# Patient Record
Sex: FEMALE | Race: WHITE | NOT HISPANIC OR LATINO | Employment: FULL TIME
[De-identification: names, ages, dates, MRNs, and addresses within clinical notes are randomized per-mention and may not be internally consistent; named-entity substitution may affect disease eponyms.]

---

## 2017-06-10 ENCOUNTER — HEALTH MAINTENANCE LETTER (OUTPATIENT)
Age: 47
End: 2017-06-10

## 2019-09-30 ENCOUNTER — HEALTH MAINTENANCE LETTER (OUTPATIENT)
Age: 49
End: 2019-09-30

## 2021-01-15 ENCOUNTER — HEALTH MAINTENANCE LETTER (OUTPATIENT)
Age: 51
End: 2021-01-15

## 2021-03-14 ENCOUNTER — HEALTH MAINTENANCE LETTER (OUTPATIENT)
Age: 51
End: 2021-03-14

## 2021-10-24 ENCOUNTER — HEALTH MAINTENANCE LETTER (OUTPATIENT)
Age: 51
End: 2021-10-24

## 2022-02-13 ENCOUNTER — HEALTH MAINTENANCE LETTER (OUTPATIENT)
Age: 52
End: 2022-02-13

## 2022-04-10 ENCOUNTER — HEALTH MAINTENANCE LETTER (OUTPATIENT)
Age: 52
End: 2022-04-10

## 2022-10-15 ENCOUNTER — HEALTH MAINTENANCE LETTER (OUTPATIENT)
Age: 52
End: 2022-10-15

## 2022-10-26 ENCOUNTER — TELEPHONE (OUTPATIENT)
Dept: CARDIOLOGY | Facility: CLINIC | Age: 52
End: 2022-10-26

## 2022-10-26 NOTE — TELEPHONE ENCOUNTER
Peggy called to discuss the history of HCM in her family and the recommendation she was given to have genetic testing to predict her risk of developing disease.      Explained that genetic testing performed in her brother revealed a variant of unknown significance. (VUS).  A VUS is a genetic change in which we do NOT have enough data to determine if it alone is disease causing or not.  To add information regarding this specific variant, Peggy's family members participated in a family study.     Brother Prateek has a diagnosis of HCM and had genetic testing which revealed a variant of unknown significance in the ALPK3 gene.  This gene is typically inherited in an AR pattern, meaning that an individual has to have two mutations to cause disease.  However, more recently there have been reports of dominant inheritance of HCM with adult onset of left ventricular hypertrophy.  Therefore, Bizdom recommended family testing to see if anything more could be learned from this variant.      Peggy's father, mother, and sister Kymberly were approved for participation in the family studies.  Testing showed that Peggy's father Virgilio, who has a history of AFib, ablations, and LVH on ECHO, carries the ALPK3 variant.  Peggy's sister Kymberly, who had a cardiac arrest and HCM, also carries this variant. Prateek's mother Sayar does not have symptoms and does NOT carry the ALPK3 vus.    These finding add to the evidence of one ALPK3 variant being sufficient to cause disease.  However, information from one family is NOT enough to change classification of this variant.      At this time, clinical screening is recommended for all at risk family members.  Genetic testing is NOT recommended for predictive testing because results would NOT changes screening recommendations.  There is still not enough evidence to be certain this change alone is the cause of disease in this family.    Clinical screening should include ECHO, EKG, heart monitor, and  physical exam with cardiologist.  Additional testing may include MRI and stress testing.     All questions answered at this time.     Mily Roblero MS, [facility]  Licensed, Certified Genetic Counselor  [facility]  [facility]

## 2022-10-28 ENCOUNTER — CARE COORDINATION (OUTPATIENT)
Dept: CARDIOLOGY | Facility: CLINIC | Age: 52
End: 2022-10-28

## 2022-10-28 DIAGNOSIS — Z13.6 SCREENING FOR HEART DISEASE: Primary | ICD-10-CM

## 2022-10-28 DIAGNOSIS — Z84.81 FAMILY HISTORY OF GENETIC DISEASE CARRIER: ICD-10-CM

## 2022-10-28 NOTE — PROGRESS NOTES
Date: 10/28/2022    Time of Call: 11:21 AM     Diagnosis:  Genetic testing VUS for brother, family screening for HCM     [ TORB ] Ordering provider: Dr Doroteo Barker  Order: echo, labs, EKG and 14 day zio     Order received by: Ada Bennett RN      Follow-up/additional notes: sent to scheduling, left VM for patient with info and direct line to call back to schedule

## 2022-11-25 NOTE — TELEPHONE ENCOUNTER
DIAGNOSIS: Screening for heart disease [Z13.6]  Family history of genetic disease carrier [Z84.81]   DATE OF APPOINTMENT: 2.14.23   NOTES STATUS DETAILS   GENETIC TESTING Internal 10.26.22 Telephone Encounter   LABS   Internal    SCANS     EKG/MONITORS   Scheduled    IMAGES      ECHO   Scheduled    CT/CTA (head, neck, chest, abdomen, pelvis)   Internal 9.9.11 CT HEAD

## 2022-12-03 ENCOUNTER — HEALTH MAINTENANCE LETTER (OUTPATIENT)
Age: 52
End: 2022-12-03

## 2022-12-26 ENCOUNTER — TELEPHONE (OUTPATIENT)
Dept: CARDIOLOGY | Facility: CLINIC | Age: 52
End: 2022-12-26

## 2022-12-27 NOTE — TELEPHONE ENCOUNTER
LVM for patient to callback to schedule follow up with Dr. Cheek with labs, echo and ziopatch monitor prior.

## 2023-01-03 NOTE — TELEPHONE ENCOUNTER
Called pt to help reschedule appointments and follow up with Adia. Pt stated she would not like to follow up. Left our call back number if she changes her mind [phone]

## 2023-02-14 ENCOUNTER — PRE VISIT (OUTPATIENT)
Dept: CARDIOLOGY | Facility: CLINIC | Age: 53
End: 2023-02-14

## 2023-03-20 ENCOUNTER — APPOINTMENT (OUTPATIENT)
Dept: GENERAL RADIOLOGY | Facility: CLINIC | Age: 53
End: 2023-03-20
Attending: EMERGENCY MEDICINE
Payer: COMMERCIAL

## 2023-03-20 ENCOUNTER — HOSPITAL ENCOUNTER (EMERGENCY)
Facility: CLINIC | Age: 53
Discharge: HOME | End: 2023-03-20
Attending: EMERGENCY MEDICINE | Admitting: EMERGENCY MEDICINE
Payer: COMMERCIAL

## 2023-03-20 VITALS
TEMPERATURE: 98.6 F | OXYGEN SATURATION: 98 % | RESPIRATION RATE: 18 BRPM | DIASTOLIC BLOOD PRESSURE: 89 MMHG | HEART RATE: 89 BPM | SYSTOLIC BLOOD PRESSURE: 146 MMHG
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
## 2023-03-20 DIAGNOSIS — S81.812A LACERATION OF LEFT LOWER EXTREMITY, INITIAL ENCOUNTER: ICD-10-CM

## 2023-03-20 DIAGNOSIS — S81.852A DOG BITE OF LOWER LEG, LEFT, INITIAL ENCOUNTER: Primary | ICD-10-CM

## 2023-03-20 PROCEDURE — 99283 EMERGENCY DEPT VISIT LOW MDM: CPT | Mod: 25

## 2023-03-20 PROCEDURE — 73590 X-RAY EXAM OF LOWER LEG: CPT | Mod: LT

## 2023-03-20 PROCEDURE — 250N000013 HC RX MED GY IP 250 OP 250 PS 637: Performed by: EMERGENCY MEDICINE

## 2023-03-20 PROCEDURE — 12032 INTMD RPR S/A/T/EXT 2.6-7.5: CPT

## 2023-03-20 RX ADMIN — AMOXICILLIN AND CLAVULANATE POTASSIUM 1 TABLET: 875; 125 TABLET, FILM COATED ORAL at 15:38

## 2023-03-20 ASSESSMENT — ACTIVITIES OF DAILY LIVING (ADL): ADLS_ACUITY_SCORE: 33

## 2023-03-20 ASSESSMENT — ENCOUNTER SYMPTOMS
BRUISES/BLEEDS EASILY: 0
WOUND: 1

## 2023-03-20 NOTE — ED TRIAGE NOTES
Patient was bit by dog on left lower leg. Left calf wound. Patient states dog is up to date on all vaccines.      Triage Assessment     Row Name 03/20/23 6061       Respiratory WDL    Respiratory WDL WDL       Cardiac WDL    Cardiac WDL WDL       Peripheral/Neurovascular WDL    Peripheral Neurovascular WDL WDL       Cognitive/Neuro/Behavioral WDL    Cognitive/Neuro/Behavioral WDL WDL

## 2023-03-20 NOTE — ED PROVIDER NOTES
History   Chief Complaint:  Wound Check     HPI   Peggy Cee is a 53 year old female who presents with dog bite injury to the left calf.  Patient was taking her sister to the hospital today and when she went to pick her sister up to bring her into the hospital her sister's dog which is an [place] Edu[employer] bit her left calf.  She is not on blood thinner medications.  Tetanus from 2020.  No allergies to antibiotics.  Did not take anything for pain prior to arrival.  No other injuries today.    Independent Historian:   None - Patient Only    ROS:  Review of Systems   Skin: Positive for wound.   Hematological: Does not bruise/bleed easily.     Allergies:  Gluten     Medications:    amoxicillin-clavulanate (AUGMENTIN) 875-125 MG tablet  ALESSE (21) OR  Multiple Vitamin (MULTIVITAMIN OR)  Naproxen Sodium (ALEVE PO)  TYLENOL 325 MG OR TABS        Past Medical History:    Past Medical History:   Diagnosis Date     Celiac disease      Lyme disease        Past Surgical History:    No past surgical history on file.     Family History:    family history is not on file.    Social History:   reports that she has never smoked. She does not have any smokeless tobacco history on file.  PCP: No primary care provider on file.     Physical Exam     Patient Vitals for the past 24 hrs:   BP Temp Temp src Pulse Resp SpO2   03/20/23 1354 (!) 146/89 98.6  F (37  C) Temporal 89 18 98 %        Physical Exam  General: Alert, appears well-developed and well-nourished. Cooperative.     In mild distress  HEENT:  Head:  Atraumatic  Ears:  External ears are normal  Mouth/Throat:  Oropharynx is without erythema or exudate and mucous membranes are moist.   Eyes:   Conjunctivae normal and EOM are normal. No scleral icterus.  CV:  Normal rate, regular rhythm, normal heart sounds and radial pulses are 2+ and symmetric.  No murmur.  Resp:  Breath sounds are clear bilaterally    Non-labored, no retractions or accessory muscle  use  GI:  Abdomen is soft, no distension, no tenderness. No rebound or guarding.  No CVA tenderness bilaterally  MS:  Normal range of motion. No edema.    Normal strength in all 4 extremities.     Back atraumatic.    No midline cervical, thoracic, or lumbar tenderness  Skin:  Warm and dry.  Bite wound to the left calf, medial aspect, large amount of gaping to soft tissue and rhomboid shape of injury.  Neuro: Alert. Normal strength.  GCS: 15  Psych:  Normal mood and affect.    Emergency Department Course   Imaging:  XR Tibia and Fibula Left 2 Views   Final Result   IMPRESSION:   1.  Normal left tibia and fibula. No fracture.   2.  Soft tissue irregularity in the posteromedial calf, consistent   with injury.   3.  No radiodense foreign body is evident.      RIVKA GARCIA MD            SYSTEM ID:  [id number]      Report per radiology    Laboratory:  Labs Ordered and Resulted from Time of ED Arrival to Time of ED Departure - No data to display     [facility]    -Laceration Repair    Date/Time: 3/20/2023 2:28 PM  Performed by: Kj Urbina MD  Authorized by: Kj Urbina MD     Risks, benefits and alternatives discussed.      ANESTHESIA (see MAR for exact dosages):     Anesthesia method:  Local infiltration    Local anesthetic:  Lidocaine 1% WITH epi  LACERATION DETAILS     Location:  Leg    Leg location:  L lower leg    Length (cm):  4.1    REPAIR TYPE:     Repair type:  Intermediate      EXPLORATION:     Hemostasis achieved with:  Epinephrine and direct pressure    Wound exploration: wound explored through full range of motion and entire depth of wound probed and visualized      Contaminated: yes      TREATMENT:     Area cleansed with:  Saline    Amount of cleaning:  Extensive    Irrigation solution:  Sterile saline    Irrigation method:  Pressure wash    Visualized foreign bodies/material removed: no      SKIN REPAIR     Repair method:  Sutures    Suture size:  4-0    Suture material:   Nylon    Suture technique:  Simple interrupted and horizontal mattress    Number of sutures: 4 simple interrupted, 1 horizontal mattress.    APPROXIMATION     Approximation:  Close    POST-PROCEDURE DETAILS     Dressing:  Antibiotic ointment and sterile dressing        PROCEDURE    Patient Tolerance:  Patient tolerated the procedure well with no immediate complications       Emergency Department Course & Assessments:     Interventions:  Medications   amoxicillin-clavulanate (AUGMENTIN) 875-125 MG per tablet 1 tablet (has no administration in time range)        Independent Interpretation (X-rays, CTs, rhythm strip):  X-ray negative for foreign body.  No fracture    Consultations/Discussion of Management or Tests:  None        Social Determinants of Health affecting care:   None    Disposition:  The patient was discharged to home.     Impression & Plan    CMS Diagnoses: None    Medical Decision Making:  Peggy Cee is a 53 year old female who presents for evaluation of a dog bite to left calf.  The workup here in the ED shows no signs of compartment syndrome, tendon or bone injury.  No signs of foreign body or dog teeth in wound.  Dog is known so will have them observe for signs of rabies; no rabies shots indicated from ED.  The wounds were scrubbed and washed out with sterile irrigation.  Given extent of laceration did place a couple of sutures to encourage wound healing as too large of wound to allow to heal by secondary intention alone.  Patient understands the possible infectious risk with putting primary sutures in today given dog bite injury.  Will start augmentin and have them observe for signs of infection (pain, redness, warmth, red streaks, etc).  First dose of augmentin provided in ED before discharge.     Diagnosis:    ICD-10-CM    1. Dog bite of lower leg, left, initial encounter  S81.852A     W54.0XXA       2. Laceration of left lower extremity, initial encounter  S81.812A          Discharge  Medications:  New Prescriptions    AMOXICILLIN-CLAVULANATE (AUGMENTIN) 875-125 MG TABLET    Take 1 tablet by mouth 2 times daily for 10 days      3/20/2023   Kj Urbina MD White, Scott, MD  03/20/23 2703

## 2023-03-22 ENCOUNTER — HOSPITAL ENCOUNTER (OUTPATIENT)
Facility: CLINIC | Age: 53
Setting detail: OBSERVATION
Discharge: HOME | End: 2023-03-23
Attending: EMERGENCY MEDICINE | Admitting: STUDENT IN AN ORGANIZED HEALTH CARE EDUCATION/TRAINING PROGRAM
Payer: COMMERCIAL
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
## 2023-03-22 DIAGNOSIS — S81.859A: ICD-10-CM

## 2023-03-22 DIAGNOSIS — L08.9: ICD-10-CM

## 2023-03-22 LAB
ANION GAP SERPL CALCULATED.3IONS-SCNC: 8 MMOL/L (ref 7–15)
BASOPHILS # BLD AUTO: 0 10E3/UL (ref 0–0.2)
BASOPHILS NFR BLD AUTO: 1 %
BUN SERPL-MCNC: 12.3 MG/DL (ref 6–20)
CALCIUM SERPL-MCNC: 9 MG/DL (ref 8.6–10)
CHLORIDE SERPL-SCNC: 103 MMOL/L (ref 98–107)
CREAT SERPL-MCNC: 0.71 MG/DL (ref 0.51–0.95)
DEPRECATED HCO3 PLAS-SCNC: 29 MMOL/L (ref 22–29)
EOSINOPHIL # BLD AUTO: 0.1 10E3/UL (ref 0–0.7)
EOSINOPHIL NFR BLD AUTO: 2 %
ERYTHROCYTE [DISTWIDTH] IN BLOOD BY AUTOMATED COUNT: 12.2 % (ref 10–15)
GFR SERPL CREATININE-BSD FRML MDRD: >90 ML/MIN/1.73M2
GLUCOSE SERPL-MCNC: 84 MG/DL (ref 70–99)
HCT VFR BLD AUTO: 38.7 % (ref 35–47)
HGB BLD-MCNC: 12.7 G/DL (ref 11.7–15.7)
IMM GRANULOCYTES # BLD: 0 10E3/UL
IMM GRANULOCYTES NFR BLD: 0 %
LYMPHOCYTES # BLD AUTO: 1.2 10E3/UL (ref 0.8–5.3)
LYMPHOCYTES NFR BLD AUTO: 24 %
MCH RBC QN AUTO: 30.7 PG (ref 26.5–33)
MCHC RBC AUTO-ENTMCNC: 32.8 G/DL (ref 31.5–36.5)
MCV RBC AUTO: 94 FL (ref 78–100)
MONOCYTES # BLD AUTO: 0.4 10E3/UL (ref 0–1.3)
MONOCYTES NFR BLD AUTO: 9 %
NEUTROPHILS # BLD AUTO: 3.2 10E3/UL (ref 1.6–8.3)
NEUTROPHILS NFR BLD AUTO: 64 %
NRBC # BLD AUTO: 0 10E3/UL
NRBC BLD AUTO-RTO: 0 /100
PLATELET # BLD AUTO: 229 10E3/UL (ref 150–450)
POTASSIUM SERPL-SCNC: 3.9 MMOL/L (ref 3.4–5.3)
RBC # BLD AUTO: 4.14 10E6/UL (ref 3.8–5.2)
SODIUM SERPL-SCNC: 140 MMOL/L (ref 136–145)
WBC # BLD AUTO: 4.9 10E3/UL (ref 4–11)

## 2023-03-22 PROCEDURE — 99222 1ST HOSP IP/OBS MODERATE 55: CPT | Performed by: STUDENT IN AN ORGANIZED HEALTH CARE EDUCATION/TRAINING PROGRAM

## 2023-03-22 PROCEDURE — 96376 TX/PRO/DX INJ SAME DRUG ADON: CPT

## 2023-03-22 PROCEDURE — G0378 HOSPITAL OBSERVATION PER HR: HCPCS

## 2023-03-22 PROCEDURE — 99285 EMERGENCY DEPT VISIT HI MDM: CPT | Mod: 25

## 2023-03-22 PROCEDURE — 82374 ASSAY BLOOD CARBON DIOXIDE: CPT | Performed by: EMERGENCY MEDICINE

## 2023-03-22 PROCEDURE — 96366 THER/PROPH/DIAG IV INF ADDON: CPT

## 2023-03-22 PROCEDURE — 250N000009 HC RX 250: Performed by: EMERGENCY MEDICINE

## 2023-03-22 PROCEDURE — 36415 COLL VENOUS BLD VENIPUNCTURE: CPT | Performed by: EMERGENCY MEDICINE

## 2023-03-22 PROCEDURE — 250N000011 HC RX IP 250 OP 636: Performed by: EMERGENCY MEDICINE

## 2023-03-22 PROCEDURE — 250N000011 HC RX IP 250 OP 636: Performed by: STUDENT IN AN ORGANIZED HEALTH CARE EDUCATION/TRAINING PROGRAM

## 2023-03-22 PROCEDURE — 85004 AUTOMATED DIFF WBC COUNT: CPT | Performed by: EMERGENCY MEDICINE

## 2023-03-22 PROCEDURE — 87040 BLOOD CULTURE FOR BACTERIA: CPT | Performed by: EMERGENCY MEDICINE

## 2023-03-22 PROCEDURE — 82435 ASSAY OF BLOOD CHLORIDE: CPT | Performed by: EMERGENCY MEDICINE

## 2023-03-22 PROCEDURE — 96365 THER/PROPH/DIAG IV INF INIT: CPT

## 2023-03-22 PROCEDURE — 82947 ASSAY GLUCOSE BLOOD QUANT: CPT | Performed by: EMERGENCY MEDICINE

## 2023-03-22 RX ORDER — AMPICILLIN AND SULBACTAM 2; 1 G/1; G/1
3 INJECTION, POWDER, FOR SOLUTION INTRAMUSCULAR; INTRAVENOUS EVERY 6 HOURS
Status: DISCONTINUED | OUTPATIENT
Start: 2023-03-22 | End: 2023-03-23 | Stop reason: HOSPADM

## 2023-03-22 RX ORDER — AMPICILLIN AND SULBACTAM 2; 1 G/1; G/1
3 INJECTION, POWDER, FOR SOLUTION INTRAMUSCULAR; INTRAVENOUS ONCE
Status: COMPLETED | OUTPATIENT
Start: 2023-03-22 | End: 2023-03-22

## 2023-03-22 RX ORDER — ONDANSETRON 4 MG/1
4 TABLET, ORALLY DISINTEGRATING ORAL EVERY 6 HOURS PRN
Status: DISCONTINUED | OUTPATIENT
Start: 2023-03-22 | End: 2023-03-23 | Stop reason: HOSPADM

## 2023-03-22 RX ORDER — ACETAMINOPHEN 325 MG/1
650 TABLET ORAL EVERY 6 HOURS PRN
Status: DISCONTINUED | OUTPATIENT
Start: 2023-03-22 | End: 2023-03-23 | Stop reason: HOSPADM

## 2023-03-22 RX ORDER — IBUPROFEN 600 MG/1
600 TABLET, FILM COATED ORAL EVERY 6 HOURS PRN
Status: DISCONTINUED | OUTPATIENT
Start: 2023-03-22 | End: 2023-03-23 | Stop reason: HOSPADM

## 2023-03-22 RX ORDER — ACETAMINOPHEN 650 MG/1
650 SUPPOSITORY RECTAL EVERY 6 HOURS PRN
Status: DISCONTINUED | OUTPATIENT
Start: 2023-03-22 | End: 2023-03-23 | Stop reason: HOSPADM

## 2023-03-22 RX ORDER — ONDANSETRON 2 MG/ML
4 INJECTION INTRAMUSCULAR; INTRAVENOUS EVERY 6 HOURS PRN
Status: DISCONTINUED | OUTPATIENT
Start: 2023-03-22 | End: 2023-03-23 | Stop reason: HOSPADM

## 2023-03-22 RX ORDER — AZELAIC ACID 0.15 G/G
GEL TOPICAL DAILY
COMMUNITY

## 2023-03-22 RX ADMIN — Medication 3 ML: at 14:16

## 2023-03-22 RX ADMIN — AMPICILLIN SODIUM AND SULBACTAM SODIUM 3 G: 2; 1 INJECTION, POWDER, FOR SOLUTION INTRAMUSCULAR; INTRAVENOUS at 14:55

## 2023-03-22 RX ADMIN — AMPICILLIN SODIUM AND SULBACTAM SODIUM 3 G: 2; 1 INJECTION, POWDER, FOR SOLUTION INTRAMUSCULAR; INTRAVENOUS at 21:49

## 2023-03-22 ASSESSMENT — ACTIVITIES OF DAILY LIVING (ADL)
ADLS_ACUITY_SCORE: 35

## 2023-03-22 ASSESSMENT — ENCOUNTER SYMPTOMS
WOUND: 1
COLOR CHANGE: 1

## 2023-03-22 NOTE — H&P
[facility]    History and Physical - Hospitalist Service       Date of Admission:  3/22/2023    Assessment & Plan      Peggy Cee is a 53 year old female with past medical history significant for Celiac Disease admitted on 3/22/2023 with cellulitis 2/2 dog bite.     Cellulitis 2/2 dog bite   Pt initially presented to the ED on 3/20 after a dog bite. She reported that her sister's dog bit her in the left calf. Imaging of the leg was negative for fracture. She had a 4.1cm laceration to the left lower leg. The wound was irrigated and sutured in the ED. She was discharged with a prescription for augmentin which she has been taking. She returns back to the ED today because of increased pain, swelling and redness around the site of the laceration. She is afebrile with normal labs and no evidence of sepsis. Sutures were removed in the ED and wound was left open.   - Admit to observation   - Started on Unasyn in the ED, continue   - WOC consult for wound care   - Follow-up blood cultures   - Pain control PRN     Diet: Gluten Free diet   DVT Prophylaxis: Low Risk/Ambulatory with no VTE prophylaxis indicated  Castellon Catheter: Not present  Lines: None     Cardiac Monitoring: None  Code Status: Full Code     Disposition Plan         Sahara Schultz MD  Hospitalist Service  [facility]  Securely message with code-laboration (more info)  Text page via [employer] Paging/Directory     ______________________________________________________________________    Chief Complaint   Cellulitis surrounding dog bite     History is obtained from the patient    History of Present Illness   Peggy Cee is a 53 year old female who initially presented to the ED on 3/20 after a dog bite. She reported that her sister's dog bit her in the left calf. There is no concern for rabies. Tetanus is up to date. Imaging of the leg was negative for fracture. She had a 4.1cm laceration to the left lower  leg. The wound was irrigated and sutured in the ED. She was discharged with a prescription for augmentin which she has been taking. She returns back to the ED today because of increased pain, swelling and redness around the site of the laceration. No obvious discharge from the wounds. She is afebrile with normal labs and no evidence of sepsis.         Past Medical History    Past Medical History:   Diagnosis Date     Celiac disease      Lyme disease        Past Surgical History   No past surgical history on file.    Prior to Admission Medications   Prior to Admission Medications   Prescriptions Last Dose Informant Patient Reported? Taking?   VITAMIN D PO 3/22/2023 Self Yes Yes   Sig: Take 1 tablet by mouth daily   amoxicillin-clavulanate (AUGMENTIN) 875-125 MG tablet 3/22/2023 at am Self No Yes   Sig: Take 1 tablet by mouth 2 times daily for 10 days   azelaic acid (FINACIA) 15 % external gel Past Week Self Yes Yes   Sig: Apply topically daily Usually uses 4-5 times weekly.      Facility-Administered Medications: None        Review of Systems    The 10 point Review of Systems is negative other than noted in the HPI or here.     Physical Exam   Vital Signs: Temp: 97.8  F (36.6  C) Temp src: Temporal BP: 110/86 Pulse: 89   Resp: 16 SpO2: 99 % O2 Device: None (Room air)    Weight: 150 lbs 0 oz    Constitutional: Awake, alert, cooperative, no apparent distress.  Eyes: Conjunctiva and pupils examined and normal.  HEENT: Moist mucous membranes, normal dentition.  Respiratory: Clear to auscultation bilaterally, no crackles or wheezing.  Cardiovascular: Regular rate and rhythm, normal S1 and S2, and no murmur noted.  Skin: See photo below (wound was just dressed/wrapped in the ED, so I did not personally examine it).   Musculoskeletal: No joint swelling, erythema or tenderness.  Neurologic: Cranial nerves 2-12 intact, normal strength and sensation.  Psychiatric: Alert, oriented to person, place and time, no obvious anxiety  or depression.              Medical Decision Making       55 MINUTES SPENT BY ME on the date of service doing chart review, history, exam, documentation & further activities per the note.      Data     I have personally reviewed the following data over the past 24 hrs:    4.9  \   12.7   / 229     140 103 12.3 /  84   3.9 29 0.71 \       Imaging results reviewed over the past 24 hrs:   No results found for this or any previous visit (from the past 24 hour(s)).

## 2023-03-22 NOTE — ED TRIAGE NOTES
Pt was seen on Monday due to dog bite. Wound repaired, prescribed antibiotics. Pt returns due to increased swelling, pain, and redness.      Triage Assessment     Row Name 03/22/23 1201       Triage Assessment (Adult)    Airway WDL WDL       Respiratory WDL    Respiratory WDL WDL       Skin Circulation/Temperature WDL    Skin Circulation/Temperature WDL WDL       Cardiac WDL    Cardiac WDL WDL       Peripheral/Neurovascular WDL    Peripheral Neurovascular WDL WDL       Cognitive/Neuro/Behavioral WDL    Cognitive/Neuro/Behavioral WDL WDL

## 2023-03-22 NOTE — ED PROVIDER NOTES
"  History     Chief Complaint:  Wound Check     HPI   Peggy Cee is a 53 year old female who presents with a dog bite injury to the left calf. Patient was seen here yesterday and was repaired and discharged with Augmentin. Today she returns due to increased swelling, pain, and redness in spite of taking the Augmentin regularly.  She denies fevers or chills.  She denies cough or cold symptoms.    Independent Historian:   None - Patient Only    Review of External Notes: I reviewed an ED note from yesterday regarding the initial incident of the dog bite.    ROS:  Review of Systems   Skin: Positive for color change (erythema) and wound.   All other systems reviewed and are negative.    Allergies:  Gluten     Medications:    Alesse  Augmentin  Multivitamins  Naproxen sodium     Past Medical History:    Celiac disease  Lyme disease    Social History:  Reports that she has never smoked. She does not have any smokeless tobacco history on file.  PCP: No Ref-Primary, Physician     Physical Exam     Patient Vitals for the past 24 hrs:   BP Temp Temp src Pulse Resp SpO2 Height Weight   03/22/23 1500 -- -- -- -- -- 99 % -- --   03/22/23 1410 110/86 -- -- -- 16 99 % -- --   03/22/23 1200 120/80 97.8  F (36.6  C) Temporal 89 20 100 % 1.702 m (5' 7\") 68 kg (150 lb)        Physical Exam  Nursing note and vitals reviewed.  Constitutional:  Appears well-developed and well-nourished.   HENT:   Head:    Atraumatic.   Mouth/Throat:   Oropharynx is clear and moist. No oropharyngeal exudate.   Eyes:    Pupils are equal, round, and reactive to light.   Neck:    Normal range of motion. Neck supple.      No tracheal deviation present. No thyromegaly present.   Cardiovascular:  Normal rate, regular rhythm, no murmur   Pulmonary/Chest: Breath sounds are clear and equal without wheezes or crackles.  Abdominal:   Soft. Bowel sounds are normal. Exhibits no distension and      no mass. There is no tenderness.      There is no rebound and no " guarding.   Musculoskeletal:  Sutured wound to the posterior aspect of the left calf with 6cm diameter area of redness, warmth, tenderness, and swelling. No palpable fluid collection. No drainage. No lymphangitis. Good dorsalis pedis pulse.   Lymphadenopathy:  No cervical adenopathy.   Neurological:   Alert and oriented to person, place, and time.   Skin:    Skin is warm and dry. No rash noted. No pallor.     Emergency Department Course     Laboratory:  Labs Ordered and Resulted from Time of ED Arrival to Time of ED Departure   BASIC METABOLIC PANEL - Normal       Result Value    Sodium 140      Potassium 3.9      Chloride 103      Carbon Dioxide (CO2) 29      Anion Gap 8      Urea Nitrogen 12.3      Creatinine 0.71      Calcium 9.0      Glucose 84      GFR Estimate >90     CBC WITH PLATELETS AND DIFFERENTIAL    WBC Count 4.9      RBC Count 4.14      Hemoglobin 12.7      Hematocrit 38.7      MCV 94      MCH 30.7      MCHC 32.8      RDW 12.2      Platelet Count 229      % Neutrophils 64      % Lymphocytes 24      % Monocytes 9      % Eosinophils 2      % Basophils 1      % Immature Granulocytes 0      NRBCs per 100 WBC 0      Absolute Neutrophils 3.2      Absolute Lymphocytes 1.2      Absolute Monocytes 0.4      Absolute Eosinophils 0.1      Absolute Basophils 0.0      Absolute Immature Granulocytes 0.0      Absolute NRBCs 0.0     BLOOD CULTURE   BLOOD CULTURE        Procedures  Suture removal @ 1540   - Removed 5 sutures from the patient's wound on posterior aspect of left calf.   Procedure was tolerated with no complications.    Emergency Department Course & Assessments:       Interventions:  Medications   ampicillin-sulbactam (UNASYN) 3 g vial to attach to  mL bag (3 g Intravenous $New Bag 3/22/23 1455)   lido-EPINEPHrine-tetracaine (LET) topical gel GEL (3 mLs Topical $Given 3/22/23 1416)        Assessments:  1339 I entered the patient's room and obtained history.  1540 I removed patient's  sutures.    Independent Interpretation (X-rays, CTs, rhythm strip):  None    Consultations/Discussion of Management or Tests:  1531 I consulted with Dr. Schultz, hospitalist, regarding the patient's history and presentation here in the emergency department who accepted the patient for admission.        Social Determinants of Health affecting care:   None    Disposition:  The patient was admitted to the hospital under the care of Dr. Schultz.     Impression & Plan    CMS Diagnoses: None    Medical Decision Making:  I found this patient to have an infected dog bite of the left posterior calf which is failing outpatient antibiotic treatment with Augmentin.  There is no sign of abscess, sepsis, necrotizing fasciitis or cellulitis at this time.  Blood cultures x2 were performed, she was given IV antibiotic treatment, I removed her stitches to help prevent abscess formation and facilitate any possible drainage, and she was admitted to the care of the hospitalist service.    Diagnosis:    ICD-10-CM    1. Infected dog bite of lower leg, initial encounter  S81.859A     L08.9     W54.0XXA          Scribe Disclosure:  IKun Hired, am serving as a scribe at 1:29 PM on 3/22/2023 to document services personally performed by Adore Malik MD based on my observations and the provider's statements to me.     3/22/2023   Adore Malik MD [place], Cheri Lee, MD  03/22/23 5361

## 2023-03-22 NOTE — ED NOTES
LLE dog bite with 1-2 inches of redness surrounding site, mild swelling. Scant old serosang drainage seen on old dressing. Pt denies fevers.

## 2023-03-22 NOTE — PHARMACY-ADMISSION MEDICATION HISTORY
Pharmacy Medication History  Admission medication history interview status for the 3/22/2023  admission is complete. See EPIC admission navigator for prior to admission medications     Location of Interview: Patient room  Medication history sources: Patient and Surescripts    Significant changes made to the medication list:  - Removed Alesse, multivitamin, naproxen, and acetaminophen  - Added azelaic acid and vitamin D    In the past week, patient estimated taking medication this percent of the time: greater than 90%    Medication Affordability:  Not including over the counter (OTC) medications, was there a time in the past 12 months when you did not take your medications as prescribed because of cost?: No    Additional medication history information:   Recent antimicrobials:  On 3/20/23, patient was prescribed Augmentin 875-125 for SSTI with instructions of 1 tablet by mouth two times daily for 10 days. Patient has taken 4 doses of the course of antibiotics.     Medication reconciliation completed by provider prior to medication history? No    Time spent in this activity: 5 minutes    Prior to Admission medications    Medication Sig Last Dose Taking? Auth Provider Long Term End Date   amoxicillin-clavulanate (AUGMENTIN) 875-125 MG tablet Take 1 tablet by mouth 2 times daily for 10 days 3/22/2023 at am Yes Kj Urbina MD  3/30/23   azelaic acid (FINACIA) 15 % external gel Apply topically daily Usually uses 4-5 times weekly. Past Week Yes Unknown, Entered By History     VITAMIN D PO Take 1 tablet by mouth daily 3/22/2023 Yes Unknown, Entered By History         The information provided in this note is only as accurate as the sources available at the time of update(s)

## 2023-03-22 NOTE — ED NOTES
[facility]  ED Nurse Handoff Report    ED Chief complaint: Wound Check      ED Diagnosis:   Final diagnoses:   None       Code Status: not addressed at this time    Allergies:   Allergies   Allergen Reactions     Gluten        Patient Story: Pt comes in for infected dog bit. Pt was here for dog bite 3 days ago and had sutures placed and was discharged with Abx which she is still taking. Pt noticed redness and swelling at site today. Denies fevers.    Focused Assessment:  A&Ox4. VSS on RA. Pain mild/moderate in leg.   Labs Ordered and Resulted from Time of ED Arrival to Time of ED Departure   CBC WITH PLATELETS AND DIFFERENTIAL       Result Value    WBC Count 4.9      RBC Count 4.14      Hemoglobin 12.7      Hematocrit 38.7      MCV 94      MCH 30.7      MCHC 32.8      RDW 12.2      Platelet Count 229      % Neutrophils 64      % Lymphocytes 24      % Monocytes 9      % Eosinophils 2      % Basophils 1      % Immature Granulocytes 0      NRBCs per 100 WBC 0      Absolute Neutrophils 3.2      Absolute Lymphocytes 1.2      Absolute Monocytes 0.4      Absolute Eosinophils 0.1      Absolute Basophils 0.0      Absolute Immature Granulocytes 0.0      Absolute NRBCs 0.0     BASIC METABOLIC PANEL   BLOOD CULTURE   BLOOD CULTURE       Treatments and/or interventions provided: Blood cultures, IV Abx  Patient's response to treatments and/or interventions: continuing to assess    To be done/followed up on inpatient unit:  cont with admitting MD orders    Does this patient have any cognitive concerns?: none    Activity level - Baseline/Home:  Independent  Activity Level - Current:   Independent    Patient's Preferred language: English   Needed?: No    Isolation: None  Infection: Not Applicable  Patient tested for COVID 19 prior to admission: NO  Bariatric?: No    Vital Signs:   Vitals:    03/22/23 1200   BP: 120/80   Pulse: 89   Resp: 20   Temp: 97.8  F (36.6  C)   TempSrc: Temporal   SpO2: 100%  "  Weight: 68 kg (150 lb)   Height: 1.702 m (5' 7\")       Cardiac Rhythm:     Was the PSS-3 completed:   Yes  What interventions are required if any?               Family Comments: none at bedside      For the majority of the shift this patient's behavior was Green.   Behavioral interventions performed were none.    ED NURSE PHONE NUMBER: [phone]     3  "

## 2023-03-22 NOTE — PROGRESS NOTES
RECEIVING UNIT ED HANDOFF REVIEW    ED Nurse Handoff Report was reviewed by: Bogdan Mcgee RN on March 22, 2023 at 4:42 PM     Patient is AOx4, independent, denies pain, dressing CDI on Right leg, ABX, glutin free diet.   No any distress noted. Patient is resting comfortably in bed. Bogdan Mcgee RN on 3/22/2023 at 5:48 PM

## 2023-03-23 VITALS
HEART RATE: 79 BPM | SYSTOLIC BLOOD PRESSURE: 100 MMHG | DIASTOLIC BLOOD PRESSURE: 68 MMHG | WEIGHT: 150 LBS | HEIGHT: 67 IN | OXYGEN SATURATION: 98 % | TEMPERATURE: 98.1 F | RESPIRATION RATE: 18 BRPM | BODY MASS INDEX: 23.54 KG/M2

## 2023-03-23 PROCEDURE — G0378 HOSPITAL OBSERVATION PER HR: HCPCS

## 2023-03-23 PROCEDURE — 99238 HOSP IP/OBS DSCHRG MGMT 30/<: CPT | Performed by: PHYSICIAN ASSISTANT

## 2023-03-23 PROCEDURE — 250N000011 HC RX IP 250 OP 636: Performed by: STUDENT IN AN ORGANIZED HEALTH CARE EDUCATION/TRAINING PROGRAM

## 2023-03-23 PROCEDURE — 96376 TX/PRO/DX INJ SAME DRUG ADON: CPT

## 2023-03-23 PROCEDURE — G0463 HOSPITAL OUTPT CLINIC VISIT: HCPCS

## 2023-03-23 RX ORDER — ACETAMINOPHEN 325 MG/1
650 TABLET ORAL EVERY 6 HOURS PRN
COMMUNITY
Start: 2023-03-23

## 2023-03-23 RX ORDER — IBUPROFEN 600 MG/1
600 TABLET, FILM COATED ORAL EVERY 6 HOURS PRN
COMMUNITY
Start: 2023-03-23

## 2023-03-23 RX ADMIN — AMPICILLIN SODIUM AND SULBACTAM SODIUM 3 G: 2; 1 INJECTION, POWDER, FOR SOLUTION INTRAMUSCULAR; INTRAVENOUS at 15:10

## 2023-03-23 RX ADMIN — AMPICILLIN SODIUM AND SULBACTAM SODIUM 3 G: 2; 1 INJECTION, POWDER, FOR SOLUTION INTRAMUSCULAR; INTRAVENOUS at 09:10

## 2023-03-23 RX ADMIN — AMPICILLIN SODIUM AND SULBACTAM SODIUM 3 G: 2; 1 INJECTION, POWDER, FOR SOLUTION INTRAMUSCULAR; INTRAVENOUS at 03:47

## 2023-03-23 ASSESSMENT — ACTIVITIES OF DAILY LIVING (ADL)
ADLS_ACUITY_SCORE: 35

## 2023-03-23 NOTE — CONSULTS
[facility]  WOC Nurse Inpatient Assessment     Consulted for:  Left leg      Summary:  Small infected wound s/p dog bite.  Prior sutures removed in ED.  WOC not able to easily probe any tunneling or significant depth; wound also too tender for any aggressive probing.  Some of the ecchymotic tissue may eventually necrose and debride as part of a natural evolution.  Will cleanse with Vashe as a topical antimicrobial and use Plurogel to promote autolytic debridement as well as general tissue healing and comfort.      Patient History (according to provider note(s):      Peggy Cee is a 53 year old female who initially presented to the ED on 3/20 after a dog bite. She reported that her sister's dog bit her in the left calf. There is no concern for rabies. Tetanus is up to date. Imaging of the leg was negative for fracture. She had a 4.1cm laceration to the left lower leg. The wound was irrigated and sutured in the ED. She was discharged with a prescription for augmentin which she has been taking. She returns back to the ED today because of increased pain, swelling and redness around the site of the laceration. No obvious discharge from the wounds. She is afebrile with normal labs and no evidence of sepsis.     Areas Assessed:      Areas visualized during today's visit: LLE    Wound location:  Left medial calf    Last photo: 3-23-23      Wound due to: dog bite  Wound history/plan of care: s/p bite from [facility], initially seen and sutured in ED 3/20, pt now returned for increased s/s infection and sutures removed in ED 3/23.    Wound base: dull red moist tissue where visibly open; ecchymotic tissue on the surface/skin flap, starting to necrose in areas     Palpation of the wound bed: normal      Drainage: small     Description of drainage: serosanguinous     Measurements (length x width x depth, in cm): approx 3 x 2 x 0.3+cm       Tunneling: none obvious     Undermining: none  obvious  Periwound skin: intact with defined area of erythema extending approx 12 x 7cm; mildly edematous       Color: pink      Temperature: normal   Odor: none  Pain: moderate, aching and tender  Pain interventions prior to dressing change: slow and gentle cares   Treatment goal: Heal , Drainage control, Infection control/prevention, Pain control, Protection and Soften necrotic tissue  STATUS: initial assessment and evolving  Supplies ordered: gathered and at bedside       Treatment Plan:     Left leg wound: Daily and prn:   1.  Cleanse with Vashe-moist gauze: allow the gauze to soak on wound for at least 2 minutes, then wipe clean and allow to air dry.   2.  Apply Plurogel to center of an Optifoam Gentle Border 4x4, in approx size/shape of wound, at least nickel thick, and press dressing onto wound.     Follow-up with primary care or with wound clinic if worsens or fails to improve.   Discharge instructions placed in AVS with phone number for Abrahan GHULAM.     Orders: Written    RECOMMEND PRIMARY TEAM ORDER: None, at this time  Education provided: plan of care, wound progress and follow-up  Discussed plan of care with: Patient and Nurse  WOC nurse follow-up plan: 1-2x week and prn  Notify WOC if wound(s) deteriorate.  Nursing to notify the Provider(s) and re-consult the WOC Nurse if new skin concern.    DATA:     Current support surface: Standard  Atmos Air mattress  Containment of urine/stool: Continent of bladder and Continent of bowel  BMI: Body mass index is 23.49 kg/m .   Active diet order: Orders Placed This Encounter      Gluten Free Diet     Output: No intake/output data recorded.     Labs: Recent Labs   Lab 03/22/23  1414   HGB 12.7   WBC 4.9     Pressure injury risk assessment:   Sensory Perception: 4-->no impairment  Moisture: 4-->rarely moist  Activity: 4-->walks frequently  Mobility: 4-->no limitation  Nutrition: 4-->excellent  Friction and Shear: 3-->no apparent problem  Enrike Score: 23    Lashae  Roseline RN CWOCN  -Securely message with [email or web address] (more info) - can reach individually by name or search 'WOC Nurse' (Abrahan) to reach all current WOCs on duty.  -WOC Office Phone: [phone]

## 2023-03-23 NOTE — PROGRESS NOTES
Observation goals  PRIOR TO DISCHARGE       Comments: -diagnostic tests and consults completed and resulted   -vital signs normal or at patient baseline Met  -tolerating oral antibiotics or has plans for home infusion setup Not met  -infection is improving -Progressing  -returns to baseline functional status Met  -safe disposition plan has been identified Not met  Nurse to notify provider when observation goals have been met and patient is ready for discharge.

## 2023-03-23 NOTE — DISCHARGE INSTRUCTIONS
Left leg wound: Daily and as needed:   1.  Cleanse with Vashe-moist gauze: allow the gauze to soak on wound for at least 2 minutes, then wipe wound clean and allow to air dry.   2.  Optional: swab periwound skin with Cavilon no-sting barrier film, let dry. (This helps protect skin from tape-stripping; only need to apply every few days).   3.  Apply Plurogel to center of a non-stick dressing, in approx size/shape of wound, at least nickel thick, and then press dressing onto wound.  (Can use oil-emulsion gauze, non-adherent gauze pads, large bandaids, foam dressings, or similar).   4.  Secure with gauze and gentle tape as needed.     If wound worsens or fails to improve, see your primary care doctor and consider making an appt at a wound clinic, ie [facility] (phone [phone]).

## 2023-03-23 NOTE — PROGRESS NOTES
Observation goals  PRIOR TO DISCHARGE     -diagnostic tests and consults completed and resulted - Met  -vital signs normal or at patient baseline - Met  -tolerating oral antibiotics or has plans for home infusion setup - Not met  -infection is improving - Progressing  -returns to baseline functional status - Met  -safe disposition plan has been identified - Not met    Nurse to notify provider when observation goals have been met and patient is ready for discharge.

## 2023-03-23 NOTE — PROGRESS NOTES
Discharge summary handed to pt prior to discharge. Teaching done and pt verbalized understanding. Pt discharged to home.

## 2023-03-23 NOTE — DISCHARGE SUMMARY
[facility]  Hospitalist Discharge Summary     Admit Date:  3/22/2023  Discharge Date:     3/23/2023  Discharging Provider: Domenic Landon PA-C    PRIMARY CARE PROVIDER:    No Ref-Primary, Physician     DISCHARGE DIAGNOSES:   Left lower extremity cellulitis 2/2 dog bite   Celiac disease     BRIEF HISTORY OF PRESENT ILLNESS:    Peggy Cee is a 53 year old female who was admitted on 3/22/2023.      Past medical history significant for Celiac Disease who was registered to short/stay-observation after failing PO antibiotics for left lower extremity cellulitis secondary to a dog bite.       Patient initially presented to the ED on 3/20 after a dog bite. She reported that her sister's dog bit her in the left calf. Imaging of the leg was negative for fracture. She had a 4.1 cm laceration to the left lower leg. The wound was irrigated and sutured in the ED. She was discharged with a prescription for augmentin which she was taking.      Patient returned to the ED 3/22 because of increased pain, swelling and redness around the site of the laceration on her left calf. She is afebrile with unremarkable labs  (BMP and CBC with diff) and no evidence of sepsis. Blood cultures were obtained and in process.  Sutures were removed in the ED and wound was left open.      HOSPITAL COURSE:   Left lower extremity cellulitis 2/2 dog bite   - Previously prescribed Augmentin was held for IV Unasyn (received 4 doses).   --Discussed with patient and she will restart previously prescribed Augmentin and complete the rest of th course (8 days).    - WOC consult for wound care appreciated:   --Patient provided with wound supplies and instructions on wound care at discharge.    - Blood cultures (No growth after 12 hours).     --In follow-up instructions advised patient to see PCP in 1 week for hospital follow-up and to review blood culture results.    - Pain control PRN.   --OTC APAP and Ibuprofen ordered  for discharge for pain management.       Celiac disease  - Gluten free diet ordered.        Clinically Significant Risk Factors Present on Admission                               The patient was discussed with Dr. Urbina who agrees with discharge at this time.       TOTAL DISCHARGE TIME:  I, Domenic Landon PA-C, personally saw the patient today and spent less than or equal to 30 minutes discharging this patient.    Domenic Landon PA-C  [facility]  Securely message with the Vocera Web Console (learn more here)  Text page via [facility] Paging/Directory      DISCHARGE MEDICATIONS:       Review of your medicines      START taking      Dose / Directions   acetaminophen 325 MG tablet  Commonly known as: TYLENOL  Used for: Infected dog bite of lower leg, initial encounter      Dose: 650 mg  Take 2 tablets (650 mg) by mouth every 6 hours as needed for mild pain or other (and adjunct with moderate or severe pain or per patient request)  Refills: 0     ibuprofen 600 MG tablet  Commonly known as: ADVIL/MOTRIN  Used for: Infected dog bite of lower leg, initial encounter      Dose: 600 mg  Take 1 tablet (600 mg) by mouth every 6 hours as needed for inflammatory pain  Refills: 0        CONTINUE these medicines which have NOT CHANGED      Dose / Directions   amoxicillin-clavulanate 875-125 MG tablet  Commonly known as: AUGMENTIN      Dose: 1 tablet  Take 1 tablet by mouth 2 times daily for 10 days  Quantity: 20 tablet  Refills: 0     azelaic acid 15 % external gel  Commonly known as: FINACIA      Apply topically daily Usually uses 4-5 times weekly.  Refills: 0     VITAMIN D PO      Dose: 1 tablet  Take 1 tablet by mouth daily  Refills: 0           Where to get your medicines      Some of these will need a paper prescription and others can be bought over the counter. Ask your nurse if you have questions.    You don't need a prescription for these medications    acetaminophen 325 MG  tablet    ibuprofen 600 MG tablet        ALLERGIES:    Allergies   Allergen Reactions     Gluten        DISPOSITION:    Discharged to home  Condition at discharge: Stable        Reason for your hospital stay    You were at [facility] due to cellulitis (skin infection) of the left leg due to a dog bite.  You received several doses of an IV antibiotic and should restart your previously prescribed Augmentin tonight and complete the rest of that medication.  You were seen by a wound nurse and provided with supplies to help with caring for the dog bite.     Activity    Your activity upon discharge: activity as tolerated     Wound care and dressings    Instructions to care for your wound at home:   Left leg dog bite daily and as needed  1.  Cleanse with Vashe-moist gauze: allow the gauze to soak on wound for at least 2 minutes, then wipe wound clean and allow to air dry.   2.  Optional: swab periwound skin with Cavilon no-sting barrier film, let dry. (This helps protect skin from tape-stripping; only need to apply every few days).   3.  Apply Plurogel to center of a non-stick dressing, in approx size/shape of wound, at least nickel thick, and then press dressing onto wound.  (Can use oil-emulsion gauze, non-adherent gauze pads, large bandaids, foam dressings, or similar).   4.  Secure with gauze and gentle tape as needed.     Follow-up and recommended labs and tests     Follow up with primary care provider, Physician No Ref-Primary, within 7 days for hospital follow-up and to review blood culture results. No follow up labs or test are needed.     Diet    Follow this diet upon discharge: Orders Placed This Encounter      Gluten Free Diet        Consultations This Hospital Stay   WOUND OSTOMY CONTINENCE NURSE  IP CONSULT     LABORATORY IMAGING AND PROCEDURES:   Laboratory studies that included CBC with platelets differential, BMP, Blood cultures x2.  No imaging studies were completed.       PENDING RESULTS:   "  Unresulted Labs Ordered in the Past 30 Days of this Admission     Date and Time Order Name Status Description    3/22/2023  1:54 PM Blood Culture Line, venous Preliminary     3/22/2023  1:54 PM Blood Culture Line, venous Preliminary       These results will be followed up by PCP.       PHYSICAL EXAMINATION ON DAY OF DISCHARGE:    /68 (BP Location: Left arm)   Pulse 79   Temp 98.1  F (36.7  C) (Oral)   Resp 18   Ht 1.702 m (5' 7\")   Wt 68 kg (150 lb)   SpO2 98%   BMI 23.49 kg/m      Constitutional: Awake, alert, cooperative, no apparent distress.    ENT: Normocephalic, without obvious abnormality, atraumatic, oral pharynx with moist mucus membranes, tonsils without erythema or exudates.     Neck: Supple, symmetrical, trachea midline, no adenopathy.  Pulmonary: No increased work of breathing, good air exchange, clear to auscultation bilaterally, no crackles or wheezing.  Cardiovascular: Regular rate and rhythm, normal S1 and S2, no S3 or S4, and no murmur noted.  GI: Normal bowel sounds, soft, non-distended, non-tender.    Skin/Integumen: Left calf bandaged but noted with some erythema and warmth to palpation.  Otehrwsie visualized skin appeared clear.  Neuro: CN II-XII grossly intact.  Patient seen moving all 4 extremities without difficulty.    Psych:  Alert and oriented x 3. Normal affect.  Extremities: No lower extremity edema noted.    "

## 2023-03-23 NOTE — PLAN OF CARE
Goal Outcome Evaluation:  Orientation/Cognitive: A&O x4  Observation Goals (Met/ Not Met): Not met  Mobility Level/Assist Equipment: Indep  Fall Risk (Y/N): N  Behavior Concerns: None  Pain Management: Mild pain to LLE, pt has been using ice for pain management, declines PRN pain meds when offered  Tele/VS/O2: VSS on RA  ABNL Lab/BG:None  Diet:Gluten free  Bowel/Bladder: Cont, voiding adequately in BR  Skin Concerns: LLE wound, dressing CDI  Drains/Devices: LUE PIV, intermittent IV abx  Tests/Procedures for next shift: WOC consult  Anticipated DC date & active delays: TBD

## 2023-03-23 NOTE — PROGRESS NOTES
Observation goals  PRIOR TO DISCHARGE     -diagnostic tests and consults completed and resulted - Not met  -vital signs normal or at patient baseline - Met  -tolerating oral antibiotics or has plans for home infusion setup - Not met  -infection is improving - Progressing  -returns to baseline functional status - Met  -safe disposition plan has been identified - Not met    Nurse to notify provider when observation goals have been met and patient is ready for discharge.

## 2023-03-23 NOTE — UTILIZATION REVIEW
"  Continued stay status; Secondary Review Determination     Admission Date: 3/22/2023 12:03 PM      Under the authority of the Utilization Management Committee, the utilization review process indicated a secondary review on the above patient.  The review outcome is based on review of the medical records, discussions with staff, and applying clinical experience noted on the date of the review.        ()      Inpatient Status Appropriate - This patient's medical care is consistent with medical management for inpatient care and reasonable inpatient medical practice.      (x) Observation Status Appropriate - This patient does not meet hospital inpatient criteria and is placed in observation status. If this patient's primary payer is Medicare and was admitted as an inpatient, Condition Code 44 should be used and patient status changed to \"observation\".   () Admission Status NOT Appropriate - This patient's medical care is not consistent with medical management for Inpatient or Observation Status.          RATIONALE FOR DETERMINATION   Peggy Cee is a 53 year old female with past medical history significant for Celiac Disease with recent dog bite cellulitis.  She has been on antibiotics with Augmentin in the outpatient setting.  She presented to emergency room due to increased pain and swelling.  Did require irrigation and suturing of wound in the emergency room.  Has been started on IV Unasyn.  She was placed in the hospital on observation status for further monitoring.  At this time, observation status at the hospital remains appropriate hospital status.      The severity of illness, intensity of service provided, expected LOS and risk for adverse outcome make the care appropriate for observation level care at the hospital.        The information on this document is developed by the utilization review team in order for the business office to ensure compliance.  This only denotes the appropriateness of proper " admission status and does not reflect the quality of care rendered.         The definitions of Inpatient Status and Observation Status used in making the determination above are those provided in the CMS Coverage Manual, Chapter 1 and Chapter 6, section 70.4.      Sincerely,     Tommie Adame D.O.  Utilization Review/ Case Management  [facility].

## 2023-03-27 LAB
BACTERIA BLD CULT: NO GROWTH
BACTERIA BLD CULT: NO GROWTH

## 2024-01-07 ENCOUNTER — HEALTH MAINTENANCE LETTER (OUTPATIENT)
Age: 54
End: 2024-01-07

## 2025-01-25 ENCOUNTER — HEALTH MAINTENANCE LETTER (OUTPATIENT)
Age: 55
End: 2025-01-25